# Patient Record
Sex: FEMALE | Race: WHITE | NOT HISPANIC OR LATINO
[De-identification: names, ages, dates, MRNs, and addresses within clinical notes are randomized per-mention and may not be internally consistent; named-entity substitution may affect disease eponyms.]

---

## 2021-03-01 PROBLEM — Z00.00 ENCOUNTER FOR PREVENTIVE HEALTH EXAMINATION: Status: ACTIVE | Noted: 2021-03-01

## 2021-03-12 ENCOUNTER — RESULT REVIEW (OUTPATIENT)
Age: 51
End: 2021-03-12

## 2021-03-12 ENCOUNTER — APPOINTMENT (OUTPATIENT)
Dept: HEMATOLOGY ONCOLOGY | Facility: CLINIC | Age: 51
End: 2021-03-12
Payer: MEDICAID

## 2021-03-12 VITALS
HEART RATE: 94 BPM | HEIGHT: 62.6 IN | WEIGHT: 142 LBS | OXYGEN SATURATION: 99 % | RESPIRATION RATE: 16 BRPM | SYSTOLIC BLOOD PRESSURE: 182 MMHG | BODY MASS INDEX: 25.48 KG/M2 | TEMPERATURE: 99.9 F | DIASTOLIC BLOOD PRESSURE: 72 MMHG

## 2021-03-12 DIAGNOSIS — F17.200 NICOTINE DEPENDENCE, UNSPECIFIED, UNCOMPLICATED: ICD-10-CM

## 2021-03-12 DIAGNOSIS — Z78.9 OTHER SPECIFIED HEALTH STATUS: ICD-10-CM

## 2021-03-12 DIAGNOSIS — Z86.39 PERSONAL HISTORY OF OTHER ENDOCRINE, NUTRITIONAL AND METABOLIC DISEASE: ICD-10-CM

## 2021-03-12 PROCEDURE — 99205 OFFICE O/P NEW HI 60 MIN: CPT

## 2021-03-12 PROCEDURE — 99072 ADDL SUPL MATRL&STAF TM PHE: CPT

## 2021-03-12 RX ORDER — ATORVASTATIN CALCIUM 40 MG/1
40 TABLET, FILM COATED ORAL
Refills: 0 | Status: ACTIVE | COMMUNITY
Start: 2021-03-12

## 2021-03-12 NOTE — ASSESSMENT
[FreeTextEntry1] : #anemia\par We have discussed the differential diagnosis of anemia.\par Will also rule out causes of anemia including nutritional deficiencies, thyroid dysfunction, hemolysis and hematologic disorders. Will check CBC with diff, CMP, LDH, Haptoglobin, Jese, B12, Folate, TSH, retic ct, Epo, PS, ESR/CRP, Immunoglobulins,  DANNI.\par She takes oral iron currently\par If found to be iron deficient recommend parenteral iron in the form of venofer 200 mg x 5. \par We have reviewed the side effects of venofer to include but are not limited to N/V,dizziness, infusion reactions, anaphylaxis, HA, rash.\par \par #tobacco abuse - advised cessation\par \par #HLD - continue atorvastatin\par \par #HTN - told to discuss with Dr. Grace and monitor. If HA, vision changes, CP, or dizziness to go to ED\par monitor closely\par \par #health maintenance\par mammo done last year\par has appt with GI next week. recommend [facility]\par Recommend seeing GYN \par \par telehealth in 1 week to discuss blood work. If IV iron is needed will need covid swab\par \par

## 2021-03-12 NOTE — HISTORY OF PRESENT ILLNESS
[de-identified] : Ms. Walden is a 51 year old female who presents to the office for initial consult of anemia.  \par She was first told she was anemic about 3 years ago.  PCP Carley Grace.\par She reports  craving to chew ice and easily bruise. Complains of heavy periods recently. Has to change feminine products every hour, No blood in stool. No CP, SOB, dizziness, vision changes. No fatigue\par \par Age of Menarche: 13\par LMP: 3/9/2021, very heavy, last about 5-7 days\par OCP/HRT: denies\par \par \par Smoke: current 1/2 ppd, active smoker x 20 years\par \par Family History\par denies family history bleeding, clotting, and cancer

## 2021-03-12 NOTE — CONSULT LETTER
[Dear  ___] : Dear  [unfilled], [Consult Letter:] : I had the pleasure of evaluating your patient, [unfilled]. [Please see my note below.] : Please see my note below. [Consult Closing:] : Thank you very much for allowing me to participate in the care of this patient.  If you have any questions, please do not hesitate to contact me. [Sincerely,] : Sincerely, [FreeTextEntry3] : Kathrine Chacon DO, FACALEX, FACP\par Medical Oncology and Hematology\par [facility] [place]\par

## 2021-03-15 ENCOUNTER — APPOINTMENT (OUTPATIENT)
Dept: HEMATOLOGY ONCOLOGY | Facility: CLINIC | Age: 51
End: 2021-03-15
Payer: MEDICAID

## 2021-03-15 DIAGNOSIS — I10 ESSENTIAL (PRIMARY) HYPERTENSION: ICD-10-CM

## 2021-03-15 PROCEDURE — 99215 OFFICE O/P EST HI 40 MIN: CPT | Mod: 95

## 2021-03-15 RX ORDER — ACETAMINOPHEN/DIPHENHYDRAMINE 500MG-25MG
1000 TABLET ORAL
Qty: 30 | Refills: 0 | Status: ACTIVE | COMMUNITY
Start: 2021-03-15 | End: 1900-01-01

## 2021-03-15 NOTE — CONSULT LETTER
[Dear  ___] : Dear  [unfilled], [Consult Letter:] : I had the pleasure of evaluating your patient, [unfilled]. [Please see my note below.] : Please see my note below. [Consult Closing:] : Thank you very much for allowing me to participate in the care of this patient.  If you have any questions, please do not hesitate to contact me. [Sincerely,] : Sincerely, [FreeTextEntry3] : Kathrine Chacon DO, FACALEX, FACP\par Medical Oncology and Hematology\par [facility] [place]\par

## 2021-03-15 NOTE — HISTORY OF PRESENT ILLNESS
[de-identified] : Ms. Walden is a 51 year old female who presents to the office for initial consult of anemia.  \par She was first told she was anemic about 3 years ago.  PCP Carley Grace.\par She reports  craving to chew ice and easily bruise. Complains of heavy periods recently. Has to change feminine products every hour, No blood in stool. No CP, SOB, dizziness, vision changes. No fatigue\par \par Age of Menarche: 13\par LMP: 3/9/2021, very heavy, last about 5-7 days\par OCP/HRT: denies\par \par \par Smoke: current 1/2 ppd, active smoker x 20 years\par \par Family History\par denies family history bleeding, clotting, and cancer [de-identified] : Patient seen and examined and here today for follow up\par Feeling tired.

## 2021-03-15 NOTE — ASSESSMENT
[FreeTextEntry1] : #anemia 2/2 JESUS\par ferritin 37 with elevated ESR\par Will give venofer 200 mg x 5 dose. Reviewed side effects\par otherwise, CRP, TSH wnl\par Immunoglobulins wnl\par B12 low normal - will give B12 1000 mcg\par no evidence of hemolysis\par \par #B12 def\par B12 low normal - will give B12 1000 mcg\par \par #tobacco abuse - advised cessation\par \par #HLD - continue atorvastatin\par \par #HTN - told to discuss with Dr. Grace and monitor. If HA, vision changes, CP, or dizziness to go to ED\par monitor closely\par \par #health maintenance\par mammo done last year\par has appt with GI next week. recommend [facility]\par Recommend seeing GYN \par \par RTC in 8 weeks with cbc with diff, CMP, iron, ferritin, B12/ Folate

## 2021-03-15 NOTE — REASON FOR VISIT
[Other Location: e.g. School (Enter Location, City,State)___] : at [unfilled], at the time of the visit. [Medical Office: ([facility])___] : at the medical office located in  [Family Member] : family member [Verbal consent obtained from patient] : the patient, [unfilled] [Follow-Up Visit] : a follow-up visit for [FreeTextEntry2] : Anemia

## 2021-03-17 ENCOUNTER — APPOINTMENT (OUTPATIENT)
Dept: HEMATOLOGY ONCOLOGY | Facility: CLINIC | Age: 51
End: 2021-03-17

## 2021-03-19 ENCOUNTER — RESULT REVIEW (OUTPATIENT)
Age: 51
End: 2021-03-19

## 2021-04-16 ENCOUNTER — APPOINTMENT (OUTPATIENT)
Dept: HEMATOLOGY ONCOLOGY | Facility: CLINIC | Age: 51
End: 2021-04-16
Payer: MEDICAID

## 2021-04-16 ENCOUNTER — RESULT REVIEW (OUTPATIENT)
Age: 51
End: 2021-04-16

## 2021-04-16 VITALS
TEMPERATURE: 99.6 F | RESPIRATION RATE: 18 BRPM | BODY MASS INDEX: 25.84 KG/M2 | OXYGEN SATURATION: 99 % | DIASTOLIC BLOOD PRESSURE: 78 MMHG | SYSTOLIC BLOOD PRESSURE: 148 MMHG | HEIGHT: 62.6 IN | WEIGHT: 144 LBS | HEART RATE: 118 BPM

## 2021-04-16 PROCEDURE — 99215 OFFICE O/P EST HI 40 MIN: CPT

## 2021-04-16 PROCEDURE — 99072 ADDL SUPL MATRL&STAF TM PHE: CPT

## 2021-04-16 NOTE — HISTORY OF PRESENT ILLNESS
[de-identified] : Ms. Walden is a 51 year old female who presents to the office for initial consult of anemia.  \par She was first told she was anemic about 3 years ago.  PCP Carley Grace.\par She reports  craving to chew ice and easily bruise. Complains of heavy periods recently. Has to change feminine products every hour, No blood in stool. No CP, SOB, dizziness, vision changes. No fatigue\par \par Age of Menarche: 13\par LMP: 3/9/2021, very heavy, last about 5-7 days\par OCP/HRT: denies\par \par \par Smoke: current 1/2 ppd, active smoker x 20 years\par \par Family History\par denies family history bleeding, clotting, and cancer [de-identified] : Patient seen and examined and here today for follow up\par Complains of heavy bleeding, palpatations, weakness

## 2021-04-16 NOTE — ASSESSMENT
[FreeTextEntry1] : #anemia 2/2 JESUS\par ferritin 37 with elevated ESR\par otherwise, CRP, TSH wnl\par Immunoglobulins wnl\par B12 low normal - will give B12 1000 mcg\par no evidence of hemolysis\par s/p venofer 200 mg x 5 dose. 3/22 - 4/5 (had all 5)\par continues to have menorrhagia - given tranexamic acid and ob gyn eval. hgb 6.8 and symptomatic. recommend 1 unit prbcs\par \par #B12 def\par B12 low normal - continue B12 1000 mcg\par \par #tobacco abuse - advised cessation\par \par #HLD - continue atorvastatin\par \par #HTN - told to discuss with Dr. Grace and monitor. If HA, vision changes, CP, or dizziness to go to ED\par monitor closely\par \par #health maintenance\par mammo done last year\par has appt with GI next week. recommend [facility]\par Recommend seeing GYN - referral given\par \par RTC in 2 weeks with cbc with diff, CMP

## 2021-04-16 NOTE — CONSULT LETTER
[Dear  ___] : Dear  [unfilled], [Consult Letter:] : I had the pleasure of evaluating your patient, [unfilled]. [Please see my note below.] : Please see my note below. [Consult Closing:] : Thank you very much for allowing me to participate in the care of this patient.  If you have any questions, please do not hesitate to contact me. [Sincerely,] : Sincerely, [FreeTextEntry3] : Kathrine Chacon DO, FACALEX, FACP\par Medical Oncology and Hematology\par [facility] [place]\par

## 2021-04-19 ENCOUNTER — APPOINTMENT (OUTPATIENT)
Dept: OBGYN | Facility: CLINIC | Age: 51
End: 2021-04-19
Payer: MEDICAID

## 2021-04-19 VITALS
WEIGHT: 146 LBS | SYSTOLIC BLOOD PRESSURE: 122 MMHG | DIASTOLIC BLOOD PRESSURE: 60 MMHG | HEIGHT: 62 IN | BODY MASS INDEX: 26.87 KG/M2

## 2021-04-19 PROCEDURE — 99214 OFFICE O/P EST MOD 30 MIN: CPT | Mod: 25

## 2021-04-19 PROCEDURE — 99072 ADDL SUPL MATRL&STAF TM PHE: CPT

## 2021-04-19 PROCEDURE — 58100 BIOPSY OF UTERUS LINING: CPT

## 2021-04-20 NOTE — HISTORY OF PRESENT ILLNESS
[Patient reported PAP Smear was normal] : Patient reported PAP Smear was normal [N] : Patient is not sexually active [PapSmeardate] : 2018/2019 [LMPDate] : 04/05/2021 [MensesFreq] : monthly [MensesLength] : 7 [MensesAmount] : moderate/heavy [[facility]xFulerm] : 5 [PGHxTotal] : 5 [PGHxPremature] : 0 [PGHxAbortions] : 0 [[facility] : 5 [FreeTextEntry1] :  x 5

## 2021-04-20 NOTE — PLAN
[FreeTextEntry1] : AUB\par Reviewed causes \par plan is for EMBx today\par \par see procedure note \par will await for pathology\par will fax PUS \par normal TSH by DR Chacon\par \par All correspondences should be with eladio Braxton\par [phone]\par \par Patsy Hi MD, PhD\par

## 2021-04-20 NOTE — REVIEW OF SYSTEMS
[Fatigue] : fatigue [Dyspnea] : no dyspnea [Cough] : no cough [Chest Pain] : no chest pain [Palpitations] : no palpitations [Abdominal Pain] : no abdominal pain [Constipation] : no constipation [Urgency] : no urgency [Frequency] : no frequency [Incontinence] : no incontinence [Dysuria] : no dysuria [Abn Vaginal bleeding] : abnormal vaginal bleeding [Pelvic pain] : no pelvic pain [CVA Pain] : no CVA pain [Genital Rash/Irritation] : no genital rash/irritation [Breast Pain] : no breast pain [Breast Lump] : no breast lump [Skin Rash] : no skin rash [Arthralgias] : no arthralgias [Myalgias] : no myalgias [Headache] : no headache [Dizziness] : no dizziness [Anxiety] : no anxiety [Depression] : no depression [Deepening Voice] : no deepening voice [Feeling Weak] : not feeling weak [Easy Bleeding] : no easy bleeding [Easy Bruising] : no easy bruising [Negative] : Heme/Lymph

## 2021-04-20 NOTE — PHYSICAL EXAM
[Appropriately responsive] : appropriately responsive [Alert] : alert [No Acute Distress] : no acute distress [No Lymphadenopathy] : no lymphadenopathy [Soft] : soft [Non-tender] : non-tender [Non-distended] : non-distended [No HSM] : No HSM [No Mass] : no mass [Oriented x3] : oriented x3 [No Lesions] : no lesions  [Labia Majora] : normal [Labia Minora] : normal [Pink Rugae] : pink rugae [Scant] : There was scant vaginal bleeding [Normal] : normal [Tenderness] : nontender [Enlarged ___ wks] : enlarged [unfilled] ~Uweeks [Mass ___ cm] : no uterine mass was palpated [Uterine Adnexae] : normal [FreeTextEntry5] : no masses/polyps/CMT [FreeTextEntry9] : deferred [FreeTextEntry8] : bulky uterus

## 2021-04-20 NOTE — PROCEDURE
[Endometrial Biopsy] : Endometrial biopsy [Time out performed] : Pre-procedure time out performed.  Patient's name, date of birth and procedure confirmed. [Consent Obtained] : Consent obtained [Risks] : risks [Benefits] : benefits [Alternatives] : alternatives [Patient] : patient [Bleeding] : bleeding [Infection] : infection [Allergic Reaction] : allergic reaction [Uterine Perforation] : uterine perforation [Pain] : pain [N/A] : pregnancy test not applicable [No Premedication] : No premedication [Betadine] : Betadine [None] : none [Tenaculum] : Tenaculum [Easy Passage] : Easy passage [Sounded to ___ cm] : sounded to [unfilled] ~Ucm [Retroverted] : retroverted [Moderate] : moderate [Sent to Pathology] : placed in buffered formalin and sent for pathology [Tolerated Well] : Patient tolerated the procedure well [No Complications] : No complications [de-identified] : KORI [LMPDate] : -4/05/21

## 2021-04-23 ENCOUNTER — NON-APPOINTMENT (OUTPATIENT)
Age: 51
End: 2021-04-23

## 2021-04-23 LAB — CORE LAB BIOPSY: NORMAL

## 2021-04-30 ENCOUNTER — APPOINTMENT (OUTPATIENT)
Dept: HEMATOLOGY ONCOLOGY | Facility: CLINIC | Age: 51
End: 2021-04-30
Payer: MEDICAID

## 2021-04-30 ENCOUNTER — RESULT REVIEW (OUTPATIENT)
Age: 51
End: 2021-04-30

## 2021-04-30 VITALS
RESPIRATION RATE: 16 BRPM | TEMPERATURE: 99.1 F | DIASTOLIC BLOOD PRESSURE: 68 MMHG | BODY MASS INDEX: 26.35 KG/M2 | HEIGHT: 62.4 IN | WEIGHT: 145 LBS | HEART RATE: 87 BPM | OXYGEN SATURATION: 98 % | SYSTOLIC BLOOD PRESSURE: 128 MMHG

## 2021-04-30 DIAGNOSIS — E78.00 PURE HYPERCHOLESTEROLEMIA, UNSPECIFIED: ICD-10-CM

## 2021-04-30 PROCEDURE — 99072 ADDL SUPL MATRL&STAF TM PHE: CPT

## 2021-04-30 PROCEDURE — 99215 OFFICE O/P EST HI 40 MIN: CPT

## 2021-04-30 NOTE — ASSESSMENT
[FreeTextEntry1] : #anemia 2/2 JESUS\par ferritin 37 with elevated ESR\par otherwise, CRP, TSH wnl\par Immunoglobulins wnl\par B12 low normal - will give B12 1000 mcg\par no evidence of hemolysis\par s/p venofer 200 mg x 5 dose. 3/22 - 4/5 (had all 5)\par transfused 1 unit prbcs on 4/16/2021\par continues to have menorrhagia  - saw Dr. Espinoza plan for hysteroscopy.\par can take tranexamic acid if period resumes\par continue ferrous sulfate 325 mg BID\par \par #AUB\par saw Dr. Espinoza\par s/p endometrial biopsy - proliferative endometrium, endometrial polyp\par \par #B12 def\par B12 low normal - continue B12 1000 mcg\par \par #tobacco abuse - advised cessation\par \par #HLD - continue atorvastatin\par \par #HTN - told to discuss with Dr. Grace and monitor. If HA, vision changes, CP, or dizziness to go to ED\par monitor closely\par \par #health maintenance\par mammo done last year\par should see GI - . recommend [facility]\par saw Dr. Espinoza\par \par RTC in 2 months weeks with cbc with diff, CMP, iron, ferritin, b12/folate

## 2021-04-30 NOTE — CONSULT LETTER
[Dear  ___] : Dear  [unfilled], [Consult Letter:] : I had the pleasure of evaluating your patient, [unfilled]. [Please see my note below.] : Please see my note below. [Consult Closing:] : Thank you very much for allowing me to participate in the care of this patient.  If you have any questions, please do not hesitate to contact me. [Sincerely,] : Sincerely, [FreeTextEntry3] : Kathrine Chacon DO, FACALEX, FACP\par Medical Oncology and Hematology\par [facility] [place]\par

## 2021-04-30 NOTE — HISTORY OF PRESENT ILLNESS
[de-identified] : Ms. Walden is a 51 year old female who presents to the office for initial consult of anemia.  \par She was first told she was anemic about 3 years ago.  PCP Carley Grace.\par She reports  craving to chew ice and easily bruise. Complains of heavy periods recently. Has to change feminine products every hour, No blood in stool. No CP, SOB, dizziness, vision changes. No fatigue\par \par Age of Menarche: 13\par LMP: 3/9/2021, very heavy, last about 5-7 days\par OCP/HRT: denies\par \par \par Smoke: current 1/2 ppd, active smoker x 20 years\par \par Family History\par denies family history bleeding, clotting, and cancer [de-identified] : Patient seen and examined and here today for follow up. \par Taking oral iron 2 x a day - no constipation\par s/p 1 unit prbcs - tolerated well

## 2021-05-05 RX ORDER — FERROUS SULFATE TAB EC 325 MG (65 MG FE EQUIVALENT) 325 (65 FE) MG
325 (65 FE) TABLET DELAYED RESPONSE ORAL DAILY
Qty: 30 | Refills: 2 | Status: ACTIVE | COMMUNITY
Start: 2021-03-12 | End: 1900-01-01

## 2021-05-17 ENCOUNTER — APPOINTMENT (OUTPATIENT)
Dept: OBGYN | Facility: CLINIC | Age: 51
End: 2021-05-17
Payer: MEDICAID

## 2021-05-17 ENCOUNTER — NON-APPOINTMENT (OUTPATIENT)
Age: 51
End: 2021-05-17

## 2021-05-17 VITALS
SYSTOLIC BLOOD PRESSURE: 118 MMHG | WEIGHT: 144 LBS | DIASTOLIC BLOOD PRESSURE: 60 MMHG | HEIGHT: 62.4 IN | BODY MASS INDEX: 26.17 KG/M2

## 2021-05-17 DIAGNOSIS — Z12.4 ENCOUNTER FOR SCREENING FOR MALIGNANT NEOPLASM OF CERVIX: ICD-10-CM

## 2021-05-17 PROCEDURE — 99214 OFFICE O/P EST MOD 30 MIN: CPT

## 2021-05-17 PROCEDURE — 99072 ADDL SUPL MATRL&STAF TM PHE: CPT

## 2021-05-17 NOTE — HISTORY OF PRESENT ILLNESS
[FreeTextEntry1] : Ms Walden is a 50 yo  who presents for AUB and a prolonged cycle\par \par Reviewed medical, surgical and family history\par \par Menses normally are monthly and last 5-7 days\par can be heavy \par This has been her norm her entire life\par \par April - bled extremely heavy - heavier than normal \par and bled for 2 weeks\par Hb down to 5 range and required a blood transfusion\par bleeding stopped with tranexamic acid - has since stopped taking\par  \par Pap test normal in past\par \par Feels tired but otherwise asymtomatic\par \par Reviewed causes of Abnormal Uterine Bleeding including structural (polyps, fibroids, etc), hormonal, precancer and malignancy. Reviewed work-up to include lab work, pap test, pelvic ultrasound, endometrial biopsy.\par \par Patient did have a PUS at [facility] and will bring into office/fax\par Recommended EMBx at last visit - \par \par EMBx showed \par proliferative endometrium \par fragments of possible polyp\par \par Presents today to discuss next steps and for pap test \par \par \par

## 2021-05-17 NOTE — PHYSICAL EXAM
[Appropriately responsive] : appropriately responsive [Alert] : alert [No Acute Distress] : no acute distress [No Lymphadenopathy] : no lymphadenopathy [Soft] : soft [Non-tender] : non-tender [Non-distended] : non-distended [No HSM] : No HSM [No Mass] : no mass [Oriented x3] : oriented x3 [No Lesions] : no lesions  [Labia Majora] : normal [Labia Minora] : normal [Pink Rugae] : pink rugae [Moderate] : There was moderate vaginal bleeding [Normal] : normal [Tenderness] : nontender [Enlarged ___ wks] : enlarged [unfilled] ~Uweeks [Mass ___ cm] : no uterine mass was palpated [Uterine Adnexae] : normal [FreeTextEntry5] : no masses/polyps/CMT [FreeTextEntry9] : deferred [FreeTextEntry8] : bulky uterus

## 2021-05-17 NOTE — PLAN
[FreeTextEntry1] : Pap today given AUB\par \par Reviewed next steps for polyp\par since bleeding  - would recommend hysteroscopy with polyp removal\par reviewed risks and procedure\par \par Will schedule \par next visit daughter will be at (or via phone) so can adequately discuss\par \par answered questions\par \par \par \par need ultrasound from [place]\par \par Daughter is Laura Braxton\par [phone]\par \par \par Patsy Hi MD, PhD\par

## 2021-05-18 LAB — HPV HIGH+LOW RISK DNA PNL CVX: NOT DETECTED

## 2021-05-25 LAB — CYTOLOGY CVX/VAG DOC THIN PREP: ABNORMAL

## 2021-06-14 ENCOUNTER — APPOINTMENT (OUTPATIENT)
Dept: OBGYN | Facility: CLINIC | Age: 51
End: 2021-06-14
Payer: MEDICAID

## 2021-06-14 VITALS
WEIGHT: 145 LBS | HEIGHT: 62 IN | BODY MASS INDEX: 26.68 KG/M2 | DIASTOLIC BLOOD PRESSURE: 64 MMHG | SYSTOLIC BLOOD PRESSURE: 120 MMHG

## 2021-06-14 PROCEDURE — 99072 ADDL SUPL MATRL&STAF TM PHE: CPT

## 2021-06-14 PROCEDURE — 99214 OFFICE O/P EST MOD 30 MIN: CPT

## 2021-06-14 NOTE — PLAN
[FreeTextEntry1] : AUB\par Likely Endometrial polyp\par Anemia\par \par Reviewed hysteroscopy with polyp removal and D&C\par Reviewed risks of surgery including infection, bleeding, uterine perforation\par REviewed pathology of polyp and endometrial curettings may show precancer or cancer and require further treatment. Reviewed recovery\par eric pictures and answered all questions\par \par Patient leaving for [facility] at end of july \par \par will schedule surgery\par \par Planned procedure\par Hysteroscopy\par Polyp Removal\par D&C\par \par Patsy Hi MD, PhD\par

## 2021-06-14 NOTE — HISTORY OF PRESENT ILLNESS
[FreeTextEntry1] : Ms Walden is a 52 yo  who presents for AUB and a prolonged cycle and discussion of surgery\par Rosemarie her daugher present for interpretive help - Armenian\par \par Reviewed  interim medical, surgical and family history\par \par Briefly -- Menses normally are monthly and last 5-7 days and heavy normally\par April - bled extremely heavy - heavier than normal \par and bled for 2 weeks\par Hb down to 5 range and required a blood transfusion\par bleeding stopped with tranexamic acid - has since stopped taking\par  \par Pap test normal in past\par \par Feels tired but otherwise asymtomatic\par \par Reviewed causes of Abnormal Uterine Bleeding including structural (polyps, fibroids, etc), hormonal, precancer and malignancy. Reviewed work-up to include lab work, pap test, pelvic ultrasound, endometrial biopsy.\par \par Patient did have a PUS at [facility] -- still need to get copy\par \par \par EMBx showed \par proliferative endometrium \par fragments of possible polyp\par \par Given AUB continuing and likely polyp and anemia\par would like to proceed with next steps \par Hysteroscopy with polyp removal\par \par \par

## 2021-06-22 ENCOUNTER — APPOINTMENT (OUTPATIENT)
Dept: HEMATOLOGY ONCOLOGY | Facility: CLINIC | Age: 51
End: 2021-06-22

## 2021-06-29 ENCOUNTER — APPOINTMENT (OUTPATIENT)
Dept: HEMATOLOGY ONCOLOGY | Facility: CLINIC | Age: 51
End: 2021-06-29

## 2021-07-08 ENCOUNTER — RESULT REVIEW (OUTPATIENT)
Age: 51
End: 2021-07-08

## 2021-07-08 ENCOUNTER — APPOINTMENT (OUTPATIENT)
Dept: HEMATOLOGY ONCOLOGY | Facility: CLINIC | Age: 51
End: 2021-07-08
Payer: MEDICAID

## 2021-07-08 VITALS
HEART RATE: 68 BPM | OXYGEN SATURATION: 98 % | SYSTOLIC BLOOD PRESSURE: 161 MMHG | HEIGHT: 62.4 IN | DIASTOLIC BLOOD PRESSURE: 76 MMHG | RESPIRATION RATE: 18 BRPM | TEMPERATURE: 99 F | WEIGHT: 142 LBS | BODY MASS INDEX: 25.8 KG/M2

## 2021-07-08 DIAGNOSIS — Z72.0 TOBACCO USE: ICD-10-CM

## 2021-07-08 DIAGNOSIS — R79.1 ABNORMAL COAGULATION PROFILE: ICD-10-CM

## 2021-07-08 PROCEDURE — 99072 ADDL SUPL MATRL&STAF TM PHE: CPT

## 2021-07-08 PROCEDURE — 99215 OFFICE O/P EST HI 40 MIN: CPT

## 2021-07-10 PROBLEM — Z72.0 TOBACCO ABUSE: Status: ACTIVE | Noted: 2021-03-12

## 2021-07-10 PROBLEM — R79.1 PROLONGED PTT: Status: ACTIVE | Noted: 2021-07-08

## 2021-07-10 NOTE — CONSULT LETTER
[FreeTextEntry3] : Kathrine Chacon DO, FACALEX, FACP\par Medical Oncology and Hematology\par [facility] [place]\par

## 2021-07-10 NOTE — HISTORY OF PRESENT ILLNESS
[de-identified] : Ms. Walden is a 51 year old female who presents to the office for initial consult of anemia.  \par She was first told she was anemic about 3 years ago.  PCP Carley Grace.\par She reports  craving to chew ice and easily bruise. Complains of heavy periods recently. Has to change feminine products every hour, No blood in stool. No CP, SOB, dizziness, vision changes. No fatigue\par \par Age of Menarche: 13\par LMP: 3/9/2021, very heavy, last about 5-7 days\par OCP/HRT: denies\par \par \par Smoke: current 1/2 ppd, active smoker x 20 years\par \par Family History\par denies family history bleeding, clotting, and cancer [de-identified] : Patient seen and examined and here today for follow up. \par Received phone call from Dr. Grace that she has a Prolonged PTT\par Complains of weakness, heavy bleeding and some neuropathy of feet

## 2021-07-10 NOTE — ASSESSMENT
[FreeTextEntry1] : #anemia 2/2 JESUS\par ferritin 37 with elevated ESR\par otherwise, CRP, TSH wnl\par Immunoglobulins wnl\par B12 low normal - will give B12 1000 mcg\par no evidence of hemolysis\par s/p venofer 200 mg x 5 dose.\par transfused 1 unit prbcs on 4/16/2021\par can take tranexamic acid if period resumes\par continues to have menorrhagia  - saw Dr. Espinoza - hysteroscopy with polyp removal and D&C\par continue ferrous sulfate 325 mg BID\par 7/8/2021 - vs and labs reviewed. remains anemic and likely iron deficient given heavy bleeds despite oral iron. If iron deficient recommend IV supplementation \par \par #AUB\par saw Dr. Espinoza\par s/p endometrial biopsy - proliferative endometrium, endometrial polyp\par hysteroscopy with polyp removal and D&C planned\par \par #B12 def\par B12 low normal - continue B12 1000 mcg\par \par #tobacco abuse - advised cessation\par \par #HLD - continue atorvastatin\par \par #HTN - told to discuss with Dr. Grace and monitor. If HA, vision changes, CP, or dizziness to go to ED\par monitor closely\par \par # PTT\par We have reviewed the causes of prolonged PTT to include factor deficiencies of  VIII, IX, or XI, XII, prekallikrein, or HMW kininogen. Deficiencies of XII, prekallikrein or HMW kininogen is not associated with a bleeding diathesis. Anticoagulants such as Heparin, dabigatran, argatroban, direct factor Xa inhibitors can cause prolongation although the patient is not on any blood thinners. Acquired inhibitor of factor VIII, IX, XI, or XII  can also cause prolongation. Lupus anticoagulant can lead to prolongation of PTT however it is more likely to be associated with thrombosis than bleeding.\par \par #health maintenance\par mammo done last year\par should see GI - . recommend [facility]\par saw Dr. Espinoza\par \par RTC in 7/14 to review blood work

## 2021-07-12 ENCOUNTER — RESULT REVIEW (OUTPATIENT)
Age: 51
End: 2021-07-12

## 2021-07-14 ENCOUNTER — RESULT REVIEW (OUTPATIENT)
Age: 51
End: 2021-07-14

## 2021-07-14 ENCOUNTER — APPOINTMENT (OUTPATIENT)
Dept: HEMATOLOGY ONCOLOGY | Facility: CLINIC | Age: 51
End: 2021-07-14
Payer: MEDICAID

## 2021-07-14 VITALS
OXYGEN SATURATION: 98 % | WEIGHT: 143 LBS | TEMPERATURE: 98.6 F | RESPIRATION RATE: 16 BRPM | HEIGHT: 62.4 IN | BODY MASS INDEX: 25.98 KG/M2 | DIASTOLIC BLOOD PRESSURE: 66 MMHG | HEART RATE: 89 BPM | SYSTOLIC BLOOD PRESSURE: 140 MMHG

## 2021-07-14 PROCEDURE — 99072 ADDL SUPL MATRL&STAF TM PHE: CPT

## 2021-07-14 PROCEDURE — 99214 OFFICE O/P EST MOD 30 MIN: CPT

## 2021-07-14 NOTE — ASSESSMENT
[FreeTextEntry1] : #anemia 2/2 JESUS\par ferritin 37 with elevated ESR\par otherwise, CRP, TSH wnl\par Immunoglobulins wnl\par B12 low normal - will give B12 1000 mcg\par no evidence of hemolysis\par s/p venofer 200 mg x 5 dose.\par transfused 1 unit prbcs on 4/16/2021\par can take tranexamic acid if period resumes\par continues to have menorrhagia  - saw Dr. Espinoza - hysteroscopy with polyp removal and D&C\par continue ferrous sulfate 325 mg BID\par 7/8/2021 - vs and labs reviewed. remains anemic and likely iron deficient given heavy bleeds despite oral iron. If iron deficient recommend IV supplementation \par 7/14/2021 - was found to be iron deficient - giving venofer 400 mg x 1 since she is going to [facility] until sept.\par \par #AUB\par saw Dr. Espinoza\par s/p endometrial biopsy - proliferative endometrium, endometrial polyp\par hysteroscopy with polyp removal and D&C planned\par cleared for procedure from a hematologic stand point\par \par #B12 def\par B12 low normal - continue B12 1000 mcg\par \par #tobacco abuse - advised cessation\par \par #HLD - continue atorvastatin\par \par #HTN - told to discuss with Dr. Grace and monitor. If HA, vision changes, CP, or dizziness to go to ED\par monitor closely\par \par # PTT\par repeat by Sussy Shell on 7/12 was wnl\par [facility] NEG, Factors were wnl or elevated, not suppressed. Elevated Factor VIII is elevated as an acute phase reactant and can increase risk of thrombosis.\par She is cleared from a hematologic standpoint for surgery and does not need any medication before the procedure. Can consider tranexamic acid 650 mg PO q 6 hours after procedure if bleeding heavily.\par \par #health maintenance\par mammo done last year\par should see GI - . recommend [facility]\par saw Dr. Espinoza\par \par RTC in September with cbc with diff, CMP, iron, ferritin, B12/Folate.

## 2021-07-14 NOTE — HISTORY OF PRESENT ILLNESS
[de-identified] : Ms. Walden is a 51 year old female who presents to the office for initial consult of anemia.  \par She was first told she was anemic about 3 years ago.  PCP Carley Grace.\par She reports  craving to chew ice and easily bruise. Complains of heavy periods recently. Has to change feminine products every hour, No blood in stool. No CP, SOB, dizziness, vision changes. No fatigue\par \par Age of Menarche: 13\par LMP: 3/9/2021, very heavy, last about 5-7 days\par OCP/HRT: denies\par \par \par Smoke: current 1/2 ppd, active smoker x 20 years\par \par Family History\par denies family history bleeding, clotting, and cancer [de-identified] : Patient seen and examined and here today for follow up. \par She is feeling ok\par She was found to be iron deficient and is getting iron today. \par she is going to [facility] on monday

## 2021-07-15 ENCOUNTER — RESULT REVIEW (OUTPATIENT)
Age: 51
End: 2021-07-15

## 2021-07-15 ENCOUNTER — APPOINTMENT (OUTPATIENT)
Dept: OBGYN | Facility: HOSPITAL | Age: 51
End: 2021-07-15

## 2021-07-15 DIAGNOSIS — G89.18 OTHER ACUTE POSTPROCEDURAL PAIN: ICD-10-CM

## 2021-09-29 ENCOUNTER — APPOINTMENT (OUTPATIENT)
Dept: OBGYN | Facility: CLINIC | Age: 51
End: 2021-09-29
Payer: MEDICAID

## 2021-09-29 VITALS
DIASTOLIC BLOOD PRESSURE: 60 MMHG | SYSTOLIC BLOOD PRESSURE: 118 MMHG | BODY MASS INDEX: 26.68 KG/M2 | HEIGHT: 62 IN | WEIGHT: 145 LBS

## 2021-09-29 PROCEDURE — 99212 OFFICE O/P EST SF 10 MIN: CPT

## 2021-10-01 RX ORDER — TRANEXAMIC ACID 650 MG/1
650 TABLET ORAL 3 TIMES DAILY
Qty: 30 | Refills: 6 | Status: ACTIVE | COMMUNITY
Start: 2021-04-16 | End: 1900-01-01

## 2021-10-01 RX ORDER — OXYCODONE 5 MG/1
5 TABLET ORAL
Qty: 4 | Refills: 0 | Status: DISCONTINUED | COMMUNITY
Start: 2021-07-15 | End: 2021-10-01

## 2021-10-01 RX ORDER — DOXYCYCLINE HYCLATE 100 MG/1
100 CAPSULE ORAL
Qty: 14 | Refills: 0 | Status: ACTIVE | COMMUNITY
Start: 2021-10-01 | End: 1900-01-01

## 2021-10-01 NOTE — REVIEW OF SYSTEMS
[Fatigue] : fatigue [Abn Vaginal bleeding] : abnormal vaginal bleeding [Negative] : Heme/Lymph [Dyspnea] : no dyspnea [Cough] : no cough [Chest Pain] : no chest pain [Palpitations] : no palpitations [Abdominal Pain] : no abdominal pain [Constipation] : no constipation [Urgency] : no urgency [Frequency] : no frequency [Incontinence] : no incontinence [Dysuria] : no dysuria [Pelvic pain] : no pelvic pain [CVA Pain] : no CVA pain [Genital Rash/Irritation] : no genital rash/irritation [Breast Pain] : no breast pain [Breast Lump] : no breast lump [Skin Rash] : no skin rash [Arthralgias] : no arthralgias [Myalgias] : no myalgias [Headache] : no headache [Dizziness] : no dizziness [Anxiety] : no anxiety [Depression] : no depression [Deepening Voice] : no deepening voice [Feeling Weak] : not feeling weak [Easy Bleeding] : no easy bleeding [Easy Bruising] : no easy bruising

## 2021-10-01 NOTE — PLAN
[FreeTextEntry1] : Still with menorrhagia first cycle after surgery\par may still get decreased\par \par REviewed options of lysteda vs endometrial ablation\par \par reviewed pros and cons of each\par \par plan is to try lysteda and consider options\par \par Answered questions\par \par Patsy Hi MD, PhD\par

## 2021-10-01 NOTE — HISTORY OF PRESENT ILLNESS
[FreeTextEntry1] : Ms Walden is a 52 yo  who presents for postoperative visit\par \par She underwent an uncomplicated hysteroscopy with removal of polyp on July 15\par benign pathology\par \par Since surgery has been in [place]\par \par First menses post surgery was extremely heavy - no change\par \par

## 2021-10-01 NOTE — PHYSICAL EXAM
[Appropriately responsive] : appropriately responsive [Alert] : alert [No Acute Distress] : no acute distress [Soft] : soft [Non-tender] : non-tender [Non-distended] : non-distended [No HSM] : No HSM [No Mass] : no mass [Oriented x3] : oriented x3 [No Lesions] : no lesions  [Labia Majora] : normal [Labia Minora] : normal [Pink Rugae] : pink rugae [Moderate] : There was moderate vaginal bleeding [Normal] : normal [Enlarged ___ wks] : enlarged [unfilled] ~Uweeks [Uterine Adnexae] : normal [Tenderness] : nontender [Mass ___ cm] : no uterine mass was palpated [FreeTextEntry5] : no masses/polyps/CMT [FreeTextEntry9] : deferred [FreeTextEntry8] : bulky uterus

## 2021-10-06 ENCOUNTER — RESULT REVIEW (OUTPATIENT)
Age: 51
End: 2021-10-06

## 2021-10-06 ENCOUNTER — APPOINTMENT (OUTPATIENT)
Dept: HEMATOLOGY ONCOLOGY | Facility: CLINIC | Age: 51
End: 2021-10-06
Payer: MEDICAID

## 2021-10-06 VITALS
DIASTOLIC BLOOD PRESSURE: 60 MMHG | HEIGHT: 62 IN | OXYGEN SATURATION: 98 % | RESPIRATION RATE: 18 BRPM | BODY MASS INDEX: 26.87 KG/M2 | WEIGHT: 146 LBS | HEART RATE: 78 BPM | SYSTOLIC BLOOD PRESSURE: 133 MMHG | TEMPERATURE: 98.7 F

## 2021-10-06 VITALS
HEIGHT: 62 IN | SYSTOLIC BLOOD PRESSURE: 133 MMHG | OXYGEN SATURATION: 98 % | TEMPERATURE: 98.7 F | WEIGHT: 146.13 LBS | RESPIRATION RATE: 18 BRPM | BODY MASS INDEX: 26.89 KG/M2 | HEART RATE: 78 BPM | DIASTOLIC BLOOD PRESSURE: 60 MMHG

## 2021-10-06 PROCEDURE — 99215 OFFICE O/P EST HI 40 MIN: CPT

## 2021-10-06 NOTE — ASSESSMENT
[FreeTextEntry1] : #anemia 2/2 JESUS\par ferritin 37 with elevated ESR\par otherwise, CRP, TSH wnl\par Immunoglobulins wnl\par B12 low normal - will give B12 1000 mcg\par no evidence of hemolysis\par s/p venofer 200 mg x 5 dose.\par transfused 1 unit prbcs on 4/16/2021\par can take tranexamic acid if period resumes\par continues to have menorrhagia  - saw Dr. Espinoza - hysteroscopy with polyp removal and D&C\par continue ferrous sulfate 325 mg BID\par 7/8/2021 - vs and labs reviewed. remains anemic and likely iron deficient given heavy bleeds despite oral iron. If iron deficient recommend IV supplementation \par 7/14/2021 - was found to be iron deficient - giving venofer 400 mg x 1 since she is going to [facility] until sept.\par 10/6/2021 - reviewed note from Dr. Hi on 9/29/21. Remains with mennorhagia. Continue to follow with GYN. VS and labs reviewed. hgb 10.7. pending iron and ferritin. If she needs more iron will give venofer 400 mg x 3 doses\par \par #AUB/menorrhagia\par saw Dr. Espinoza\par s/p endometrial biopsy - proliferative endometrium, endometrial polyp\par s/p hysteroscopy with polyp removal and D&C\par remains with heavy menses - following with GYN. started on lysteda with relief\par \par #B12 def\par B12 low normal - continue B12 1000 mcg\par \par #tobacco abuse - advised cessation\par \par #HLD - continue atorvastatin\par \par #HTN - told to discuss with Dr. Grace and monitor. If HA, vision changes, CP, or dizziness to go to ED\par monitor closely\par \par #health maintenance\par mammo done last year\par should see GI - . recommend [facility]\par saw Dr. Espinoza\par \par RTC in 2 months with cbc with diff, CMP, iron, ferritin, B12/Folate.

## 2021-10-06 NOTE — HISTORY OF PRESENT ILLNESS
[de-identified] : Ms. Walden is a 51 year old female who presents to the office for initial consult of anemia.  \par She was first told she was anemic about 3 years ago.  PCP Carley Grace.\par She reports  craving to chew ice and easily bruise. Complains of heavy periods recently. Has to change feminine products every hour, No blood in stool. No CP, SOB, dizziness, vision changes. No fatigue\par \par Age of Menarche: 13\par LMP: 3/9/2021, very heavy, last about 5-7 days\par OCP/HRT: denies\par \par \par Smoke: current 1/2 ppd, active smoker x 20 years\par \par Family History\par denies family history bleeding, clotting, and cancer [de-identified] : Patient seen and examined and here today for follow up. \par s/p uncomplicated hysteroscopy with removal of polyp on July 15\par benign pathology\par Since surgery has been in [place]\par continues with menorrhagia\par had iron infusion - tolerated well\par on lysteda which is helping bleeding\par

## 2021-10-06 NOTE — CONSULT LETTER
[Dear  ___] : Dear  [unfilled], [Consult Letter:] : I had the pleasure of evaluating your patient, [unfilled]. [Please see my note below.] : Please see my note below. [Consult Closing:] : Thank you very much for allowing me to participate in the care of this patient.  If you have any questions, please do not hesitate to contact me. [Sincerely,] : Sincerely, [FreeTextEntry3] : Kathrine Chacon DO, FACALEX, FACP\par Medical Oncology and Hematology\par [facility]\par

## 2021-12-08 ENCOUNTER — RESULT REVIEW (OUTPATIENT)
Age: 51
End: 2021-12-08

## 2021-12-08 ENCOUNTER — APPOINTMENT (OUTPATIENT)
Dept: HEMATOLOGY ONCOLOGY | Facility: CLINIC | Age: 51
End: 2021-12-08
Payer: MEDICAID

## 2021-12-08 VITALS
TEMPERATURE: 98.5 F | DIASTOLIC BLOOD PRESSURE: 74 MMHG | HEIGHT: 62.01 IN | SYSTOLIC BLOOD PRESSURE: 165 MMHG | BODY MASS INDEX: 27.22 KG/M2 | HEART RATE: 88 BPM | RESPIRATION RATE: 16 BRPM | OXYGEN SATURATION: 100 % | WEIGHT: 149.8 LBS

## 2021-12-08 DIAGNOSIS — E53.8 DEFICIENCY OF OTHER SPECIFIED B GROUP VITAMINS: ICD-10-CM

## 2021-12-08 DIAGNOSIS — N92.0 EXCESSIVE AND FREQUENT MENSTRUATION WITH REGULAR CYCLE: ICD-10-CM

## 2021-12-08 DIAGNOSIS — N93.9 ABNORMAL UTERINE AND VAGINAL BLEEDING, UNSPECIFIED: ICD-10-CM

## 2021-12-08 PROCEDURE — 36415 COLL VENOUS BLD VENIPUNCTURE: CPT

## 2021-12-08 PROCEDURE — 99214 OFFICE O/P EST MOD 30 MIN: CPT | Mod: 25

## 2021-12-08 NOTE — HISTORY OF PRESENT ILLNESS
[de-identified] : Ms. Walden is a 51 year old female who presents to the office for initial consult of anemia.  \par She was first told she was anemic about 3 years ago.  PCP Carley Grace.\par She reports  craving to chew ice and easily bruise. Complains of heavy periods recently. Has to change feminine products every hour, No blood in stool. No CP, SOB, dizziness, vision changes. No fatigue\par \par Age of Menarche: 13\par LMP: 3/9/2021, very heavy, last about 5-7 days\par OCP/HRT: denies\par \par \par Smoke: current 1/2 ppd, active smoker x 20 years\par \par Family History\par denies family history bleeding, clotting, and cancer [de-identified] : Patient seen and examined and here today for follow up. \par s/p uncomplicated hysteroscopy with removal of polyp on July 15\par on lysteda which is helping bleeding\par Seeing Dr. Espinoza in January\par Feeling well but never received iron. She is scheduled for the next 3 mondays to receive venofer\par

## 2021-12-08 NOTE — CONSULT LETTER
[Dear  ___] : Dear  [unfilled], [Consult Letter:] : I had the pleasure of evaluating your patient, [unfilled]. [Please see my note below.] : Please see my note below. [Consult Closing:] : Thank you very much for allowing me to participate in the care of this patient.  If you have any questions, please do not hesitate to contact me. [Sincerely,] : Sincerely, [FreeTextEntry3] : Kathrine Chacon DO, FACALEX, FACP\par Medical Oncology and Hematology\par [facility] [place]\par

## 2021-12-08 NOTE — ASSESSMENT
[FreeTextEntry1] : #anemia 2/2 JESUS\par ferritin 37 with elevated ESR\par otherwise, CRP, TSH wnl\par Immunoglobulins wnl\par B12 low normal - will give B12 1000 mcg\par no evidence of hemolysis\par s/p venofer 200 mg x 5 dose.\par transfused 1 unit prbcs on 4/16/2021\par can take tranexamic acid if period resumes\par continues to have menorrhagia  - saw Dr. Espinoza - hysteroscopy with polyp removal and D&C\par continue ferrous sulfate 325 mg BID\par 7/8/2021 - vs and labs reviewed. remains anemic and likely iron deficient given heavy bleeds despite oral iron. If iron deficient recommend IV supplementation \par 7/14/2021 - was found to be iron deficient - giving venofer 400 mg x 1 since she is going to [facility] until sept.\par 10/6/2021 - reviewed note from Dr. Hi on 9/29/21. Remains with mennorhagia. Continue to follow with GYN. VS and labs reviewed. hgb 10.7. pending iron and ferritin. If she needs more iron will give venofer 400 mg x 3 doses\par 12/8/2021 - vs and labs reviewed. hgb 9.9. never received venofer after last visit - scheduled for 12/13, 12/20 and 12/27. Feeling well. Continues on Lysteda to manage bleeding. Seeing Dr. Espinoza in January\par \par #AUB/menorrhagia\par saw Dr. Espinoza\par s/p endometrial biopsy - proliferative endometrium, endometrial polyp\par s/p hysteroscopy with polyp removal and D&C\par remains with heavy menses - following with GYN. started on lysteda with relief\par 12/8/2021- Feeling well. Continues on Lysteda to manage bleeding. Seeing Dr. Espinoza in January\par \par #B12 def\par B12 low normal - continue B12 1000 mcg\par 12/8/21 - pending b12\par \par #tobacco abuse - advised cessation\par \par #HLD - continue atorvastatin\par \par #HTN - told to discuss with Dr. Grace and monitor. If HA, vision changes, CP, or dizziness to go to ED\par monitor closely\par \par #health maintenance\par mammo done last year\par should see GI - . recommend [facility]\par saw Dr. Espinoza\par \par RTC in 2 months with cbc with diff, CMP, iron, ferritin, B12/Folate.

## 2021-12-18 ENCOUNTER — RESULT REVIEW (OUTPATIENT)
Age: 51
End: 2021-12-18

## 2021-12-19 RX ORDER — MEDROXYPROGESTERONE ACETATE 10 MG/1
10 TABLET ORAL DAILY
Qty: 80 | Refills: 0 | Status: ACTIVE | COMMUNITY
Start: 2021-12-19 | End: 1900-01-01

## 2021-12-20 ENCOUNTER — RESULT REVIEW (OUTPATIENT)
Age: 51
End: 2021-12-20

## 2022-01-26 ENCOUNTER — APPOINTMENT (OUTPATIENT)
Dept: OBGYN | Facility: CLINIC | Age: 52
End: 2022-01-26

## 2022-02-02 DIAGNOSIS — D64.9 ANEMIA, UNSPECIFIED: ICD-10-CM

## 2022-02-09 ENCOUNTER — APPOINTMENT (OUTPATIENT)
Dept: HEMATOLOGY ONCOLOGY | Facility: CLINIC | Age: 52
End: 2022-02-09